# Patient Record
Sex: FEMALE | Race: WHITE | NOT HISPANIC OR LATINO | Employment: FULL TIME | URBAN - METROPOLITAN AREA
[De-identification: names, ages, dates, MRNs, and addresses within clinical notes are randomized per-mention and may not be internally consistent; named-entity substitution may affect disease eponyms.]

---

## 2017-02-10 ENCOUNTER — TELEPHONE (OUTPATIENT)
Dept: OBSTETRICS AND GYNECOLOGY | Facility: CLINIC | Age: 42
End: 2017-02-10

## 2017-02-10 NOTE — TELEPHONE ENCOUNTER
----- Message from Evens Lovett sent at 2/10/2017  1:03 PM EST -----  Regarding: NO CYCLE  Contact: [phone]  SAYS SHE HASN'T HAD CYCLE SINCE CHRISTMAS. HAS SOME QUESTIONS.      Dr. Bass pt  [phone] LMP 12/19/2016. She has taken 2 urine pregnancy test and they're both negative. Not using anything for protection. Patient denies pregnancy symptoms

## 2017-02-10 NOTE — TELEPHONE ENCOUNTER
Spoke to her and explained with a negative urine pregnancy test this most likely represents intermittent anovulation.  She's having no pain.  This is the first time this has occurred.  I told her if it starts to bother her I recommend a blood pregnancy test first and assuming negative progestin therapy for 10 days to cause a withdrawal flow.  If she doesn't start her cycle in the next week or 2 she is to give us a call back.

## 2017-08-18 ENCOUNTER — OFFICE VISIT (OUTPATIENT)
Dept: OBSTETRICS AND GYNECOLOGY | Facility: CLINIC | Age: 42
End: 2017-08-18

## 2017-08-18 VITALS
HEIGHT: 68 IN | RESPIRATION RATE: 14 BRPM | SYSTOLIC BLOOD PRESSURE: 118 MMHG | BODY MASS INDEX: 30.16 KG/M2 | DIASTOLIC BLOOD PRESSURE: 74 MMHG | WEIGHT: 199 LBS

## 2017-08-18 DIAGNOSIS — N92.1 METRORRHAGIA: Primary | ICD-10-CM

## 2017-08-18 DIAGNOSIS — N94.6 DYSMENORRHEA: ICD-10-CM

## 2017-08-18 PROCEDURE — 99214 OFFICE O/P EST MOD 30 MIN: CPT | Performed by: OBSTETRICS & GYNECOLOGY

## 2017-08-18 NOTE — PROGRESS NOTES
"Subjective   Chief Complaint   Patient presents with   • Gynecologic Exam     Francia Gonzalez is a 42 y.o. year old .  Patient's last menstrual period was 2017 (exact date).  She presents to be seen because of Trouble with her cycles for the past year.  It is been most significantly bad since .  She actually missed a couple cycles in the first part of year.  Had a normal cycle in March and then had a prolonged cycle in the subsequent months.  She thought she could've been pregnant.  Did take a pregnancy test and was not.  To date no testing has been done.  Other than the period in July being excessively painful, pain is not a significant part of her problem today.     Additionally, depression has been worse.  She has historically taken escitalopram 10 mg every other day.  If she takes it more often than every other day side effects become intolerable.    She currently does not have health insurance.    OTHER THINGS SHE WANTS TO DISCUSS TODAY:  Nothing else    The following portions of the patient's history were reviewed and updated as appropriate:current medications, allergies, past family history, past medical history, past social history and past surgical history    Smoking status: Never Smoker                                                                 Smokeless status: Not on file                       Review of Systems  Constitutional POS: nothing reported    NEG: anorexia or night sweats   Gastointestinal POS: nothing reported    NEG: bloating, change in bowel habits, melena or reflux symptoms   Genitourinary POS: nothing reported    NEG: dysuria or hematuria   Integument POS: nothing reported    NEG: moles that are changing in size, shape, color or rashes   Breast POS: did not get mammogram as recommend last visit    NEG: persistent breast lump, skin dimpling or nipple discharge         Objective   /74  Resp 14  Ht 68\" (172.7 cm)  Wt 199 lb (90.3 kg)  LMP 2017 (Exact Date) "  Breastfeeding? No  BMI 30.26 kg/m2    General:  well developed; well nourished  no acute distress   Pelvis: Clinical staff was present for exam  External genitalia:  normal appearance of the external genitalia including Bartholin's and [place]'s glands.  :  urethral meatus normal; urethral hypermobility is absent.  Vaginal:  normal pink mucosa without prolapse or lesions.  Cervix:  normal appearance.  Uterus:  normal size, shape and consistency. anteverted;  Adnexa:  normal bimanual exam of the adnexa.  Rectal:  digital rectal exam not performed; anus visually normal appearing.     Lab Review   No data reviewed    Imaging   No data reviewed        Assessment   1. Metrorrhagia - affecting her activities of daily living.  This is a new finding that does not need to be worked up further  2. Depression with anxiety suboptimally controlled on current regimen     Plan   1. Change and escitalopram to 5 mg daily with 10 mg in the luteal phase of her cycle  2. Start low-dose oral contraceptives for cycle regulation and control of cramps  3. Follow up for annual exam and for recheck of symptoms 4-6 months    New Medications Ordered This Visit   Medications   • norethindrone-ethinyl estradiol (NECON 1/35, 28,) 1-35 MG-MCG per tablet     Sig: Take 1 tablet by mouth Daily.     Dispense:  28 tablet     Refill:  5          This note was electronically signed.    Jarrod Bass M.D.  August 18, 2017

## 2017-08-18 NOTE — PROGRESS NOTES
"Subjective   Chief Complaint   Patient presents with   • Gynecologic Exam     Francia Gonzalez is a 42 y.o. year old  presenting to be seen for her annual exam.     SEXUAL Hx:  She {IS/is not:29798::\"is\"} currently sexually active.  In the past year there {Action - has/HAS NOT:01363::\"has not\"} been new sexual partners.    Condoms {Condom use:30693::\"are never used\"}.  She {would/WOULD NOT:57073::\"would not\"} like to be screened for STD's at today's exam.  Current birth control method: {Current contraceptive:80037}.  She {IS/is not:45825::\"is\"} happy with her current method of contraception and {does / DOES NOT:43304::\"does not\"} want to discuss alternative methods of contraception.  MENSTRUAL Hx:  Patient's last menstrual period was 2017 (exact date).  In the past 6 months her cycles have been {Desc - menses description:99893::\"regular, predictable and occur monthly\"}.  Her menstrual {Misc; menses description:74533::\"flow is typically normal\"}.   Each month on average there are roughly {Numbers; 0-7:70394::\"0\"} day(s) of very heavy flow.    Intermenstrual bleeding is {absent/present:63083::\"absent\"}.    Post-coital bleeding is {absent/present:50696::\"absent\"}.  Dysmenorrhea: {Affecting ADL?:89210::\"is not affecting her activities of daily living\"}  PMS: {Affecting ADL?:22716::\"is not affecting her activities of daily living\"}  Her cycles {are/ARE NOT:34572::\"are not\"} a source of concern for her that she wishes to discuss today.  HEALTH Hx:  She exercises regularly: {Responses; yes/NO/not asked:52367::\"no (and has no plans to become more active)\"}.  She wears her seat belt: {Responses; yes/not/not always (pre-select yes):30076::\"yes\"}.  She has concerns about domestic violence: {Responses; yes/NO/not asked:86195::\"no\"}.  OTHER THINGS SHE WANTS TO DISCUSS TODAY:  {Other complaints:79558::\"Nothing else\"}    The following portions of the patient's history were reviewed and updated as appropriate:{Misc - " "History reviewed:34746::\"problem list\",\"current medications\",\"allergies\",\"past family history\",\"past medical history\",\"past social history\",\"past surgical history\"}.    Smoking status: Never Smoker                                                                 Smokeless status: Not on file                       Review of Systems  Constitutional POS: {Constitutional (+) ROS:85146::\"nothing reported\"}    NEG: {Constitutional (-) ROS:30243::\"anorexia\",\"night sweats\"}   Gastointestinal POS: {GI (+) ROS:19809::\"nothing reported\"}    NEG: {GI (-) ROS:54564::\"bloating\",\"change in bowel habits\",\"melena\",\"reflux symptoms\"}   Genitourinary POS: { (+) ROS:11947::\"nothing reported\"}    NEG: { (-) ROS:56484::\"dysuria\",\"hematuria\"}   Integument POS: {Skin (+) ROS:69636::\"nothing reported\"}    NEG: {Skin (-) ROS:52722::\"moles that are changing in size, shape, color\",\"rashes\"}   Breast POS: {Breast (+) ROS:26900::\"nothing reported\"}    NEG: {Breast (-) ROS:15714::\"persistent breast lump\",\"skin dimpling\",\"nipple discharge\"}        Objective   /74  Resp 14  Ht 68\" (172.7 cm)  Wt 199 lb (90.3 kg)  LMP 08/07/2017 (Exact Date)  Breastfeeding? No  BMI 30.26 kg/m2    General:  {Exam - general findings:15268::\"well developed; well nourished\",\"no acute distress\"}   Skin:  {Exam - skin:27155::\"No suspicious lesions seen\"}   Thyroid: {Exam - thyroid:96233::\"normal to inspection and palpation\"}   Breasts:  {Exam - breasts:90517::\"Examined in supine position\",\"Symmetric without masses or skin dimpling\",\"Nipples normal without inversion, lesions or discharge\",\"There are no palpable axillary nodes\"}   Abdomen: {Exam - abdomen:00414::\"soft, non-tender; no masses\",\"no umbilical or inginual hernias are present\",\"no hepato-splenomegaly\"}   Pelvis: {Exam; GYN pelvic:04006::\"Clinical staff was present for exam\"}        Assessment   1. ***     Plan   1. ***  2. {Were Rx's given:75741}  3. Follow up {F/U reason:65540::\"for annual exam\"} " *** {follow-up time:22868}    No orders of the defined types were placed in this encounter.         This note was electronically signed.    Jarrod Bass M.D.  August 18, 2017

## 2018-01-12 RX ORDER — ESCITALOPRAM OXALATE 10 MG/1
TABLET ORAL
Qty: 30 TABLET | Refills: 0 | Status: SHIPPED | OUTPATIENT
Start: 2018-01-12 | End: 2018-03-10 | Stop reason: SDUPTHER

## 2018-01-22 ENCOUNTER — TELEPHONE (OUTPATIENT)
Dept: OBSTETRICS AND GYNECOLOGY | Facility: CLINIC | Age: 43
End: 2018-01-22

## 2018-01-22 NOTE — TELEPHONE ENCOUNTER
Kali pt requesting refills on birth control until her appt on 5/16/18 be sent to [facility] [place]

## 2018-03-14 RX ORDER — ESCITALOPRAM OXALATE 10 MG/1
TABLET ORAL
Qty: 30 TABLET | Refills: 0 | Status: SHIPPED | OUTPATIENT
Start: 2018-03-14 | End: 2018-05-11 | Stop reason: SDUPTHER

## 2018-05-15 RX ORDER — ESCITALOPRAM OXALATE 10 MG/1
TABLET ORAL
Qty: 30 TABLET | Refills: 0 | Status: SHIPPED | OUTPATIENT
Start: 2018-05-15 | End: 2018-05-16 | Stop reason: SDUPTHER

## 2018-05-16 ENCOUNTER — OFFICE VISIT (OUTPATIENT)
Dept: OBSTETRICS AND GYNECOLOGY | Facility: CLINIC | Age: 43
End: 2018-05-16

## 2018-05-16 VITALS
SYSTOLIC BLOOD PRESSURE: 112 MMHG | DIASTOLIC BLOOD PRESSURE: 74 MMHG | BODY MASS INDEX: 29.95 KG/M2 | WEIGHT: 197 LBS | RESPIRATION RATE: 14 BRPM

## 2018-05-16 DIAGNOSIS — Z01.419 WELL WOMAN EXAM WITH ROUTINE GYNECOLOGICAL EXAM: Primary | ICD-10-CM

## 2018-05-16 DIAGNOSIS — F41.8 DEPRESSION WITH ANXIETY: ICD-10-CM

## 2018-05-16 PROCEDURE — 99396 PREV VISIT EST AGE 40-64: CPT | Performed by: OBSTETRICS & GYNECOLOGY

## 2018-05-16 RX ORDER — ESCITALOPRAM OXALATE 10 MG/1
10 TABLET ORAL EVERY MORNING
Qty: 30 TABLET | Refills: 12 | Status: SHIPPED | OUTPATIENT
Start: 2018-05-16 | End: 2019-05-22 | Stop reason: SDUPTHER

## 2018-05-16 NOTE — PROGRESS NOTES
Subjective   Chief Complaint   Patient presents with   • Gynecologic Exam     Francia Gonzalez is a 42 y.o. year old  presenting to be seen for her annual exam. Happy with her current dose of Lexapro.  Life is busy which makes things a little more stressful but overall she is doing well on medicine.    SEXUAL Hx:  She is currently sexually active.  In the past year there there has been NO new sexual partners.    Condoms are never used.  She would not like to be screened for STD's at today's exam.  Current birth control method: OCP (estrogen/progesterone).  She is happy with her current method of contraception and does not want to discuss alternative methods of contraception.  MENSTRUAL Hx:  Patient's last menstrual period was 2018 (exact date).   Menses better with the BCP's  In the past 6 months her cycles have been regular, predictable and occur monthly.  Her menstrual flow is typically normal.   Each month on average there are roughly 0 day(s) of very heavy flow.    Intermenstrual bleeding is absent.    Post-coital bleeding is absent.  Dysmenorrhea: is not affecting her activities of daily living  PMS: is not affecting her activities of daily living  Her cycles are not a source of concern for her that she wishes to discuss today.  HEALTH Hx:  She exercises regularly: yes.  She wears her seat belt: yes.  She has concerns about domestic violence: no.  OTHER THINGS SHE WANTS TO DISCUSS TODAY:  Libido worse with the BCP's.  Not yet affecting her activities of daily living.    The following portions of the patient's history were reviewed and updated as appropriate:problem list, current medications, allergies, past family history, past medical history, past social history and past surgical history.    Smoking status: Never Smoker                                                                 Smokeless tobacco: Not on file                       Review of Systems  Constitutional POS: nothing reported    NEG:  anorexia or night sweats   Genitourinary POS: nothing reported    NEG: dysuria or hematuria      Gastointestinal POS: nothing reported    NEG: bloating, change in bowel habits, melena or reflux symptoms   Integument POS: nothing reported    NEG: moles that are changing in size, shape, color or rashes   Breast POS: nothing reported    NEG: persistent breast lump, skin dimpling or nipple discharge        Objective   /74   Resp 14   Wt 89.4 kg (197 lb)   LMP 04/19/2018 (Exact Date)   Breastfeeding? No   BMI 29.95 kg/m²     General:  well developed; well nourished  no acute distress   Skin:  No suspicious lesions seen   Thyroid: normal to inspection and palpation   Breasts:  Examined in supine position  Symmetric without masses or skin dimpling  Nipples normal without inversion, lesions or discharge  There are no palpable axillary nodes   Abdomen: soft, non-tender; no masses  no umbilical or inginual hernias are present  no hepato-splenomegaly   Pelvis: Clinical staff was present for exam  External genitalia:  normal appearance of the external genitalia including Bartholin's and [place]'s glands.  :  urethral meatus normal;  Vaginal:  normal pink mucosa without prolapse or lesions.  Cervix:  normal appearance.  Uterus:  normal size, shape and consistency. anteverted;  Adnexa:  normal bimanual exam of the adnexa.  Rectal:  digital rectal exam not performed; anus visually normal appearing.        Assessment   1. Normal GYN exam   2. DUB - better with BCP's  3. Low libido not yet impacting quality of life.  May be related to life stresses and hectic this more so than birth control pills  4. Depression with anxiety stable with medication  5. She is up to date on all relevant gynecologic and colorectal screenings except mammography     Plan   1. Pap was done today.  If she does not receive the results of the Pap within 2 weeks  time, she was instructed to call to find out the results.  I explained to Francia that  the recommendations for Pap smear interval in a low risk patient's has lengthened to 3 years time.  I encouraged her to be seen yearly for a full physical exam including breast and pelvic exam even during the off years when PAP's will not be performed.  2. She was encouraged to get yearly mammograms.  She should report any palpable breast lump(s) or skin changes regardless of mammographic findings.  I explained to Francia that notification regarding her mammogram results will come from the center performing the study.  Our office will not be routinely calling with mammogram results.  It is her responsibility to make sure that the results from the mammogram are communicated to her by the breast center.  If she has any questions about the results, she is welcome to call our office anytime.  3. Prescription(s) for OCP's and anti-depressants were refilled today   4. The importance of keeping all planned follow-up and taking all medications as prescribed was emphasized.  5. Follow up for annual exam 1 year    New Medications Ordered This Visit   Medications   • norethindrone-ethinyl estradiol (NECON 1/35, 28,) 1-35 MG-MCG per tablet     Sig: Take 1 tablet by mouth Daily.     Dispense:  28 tablet     Refill:  13   • escitalopram (LEXAPRO) 10 MG tablet     Sig: Take 1 tablet by mouth Every Morning.     Dispense:  30 tablet     Refill:  12          This note was electronically signed.    Jarrod Bass M.D.  May 16, 2018    Note: Speech recognition transcription software may have been used to create portions of this document.  An attempt at proofreading has been made but errors in transcription could still be present.

## 2019-05-19 NOTE — PROGRESS NOTES
Subjective   Chief Complaint   Patient presents with   • Gynecologic Exam     Francia Gonzalez is a 43 y.o. year old  presenting to be seen for her annual exam.  Libido is still low but slightly better.    Her depression with anxiety is manageable with medication.  Most days she is only taking half of the 10 mg pill.    Her son is overall doing well.  She gets worried about him having anxiety and depression at times.  Made friends with some of the upper classmate at school.  Sad that school is ended at its summertime.    SEXUAL Hx:  She is currently sexually active.  In the past year there there has been NO new sexual partners.    Condoms are never used.  She would not like to be screened for STD's at today's exam.  Current birth control method: OCP (estrogen/progesterone).  She is happy with her current method of contraception and does not want to discuss alternative methods of contraception.  MENSTRUAL Hx:  Patient's last menstrual period was 2019 (approximate).  In the past 6 months her cycles have been regular, predictable and occur monthly.  Her menstrual flow is typically normal.   Each month on average there are roughly 0 day(s) of very heavy flow.    Intermenstrual bleeding is absent.    Post-coital bleeding is absent.  Dysmenorrhea: is not affecting her activities of daily living  PMS: is not affecting her activities of daily living  Her cycles are not a source of concern for her that she wishes to discuss today.  HEALTH Hx:  She exercises regularly: yes.  She wears her seat belt: yes.  She has concerns about domestic violence: no.  OTHER THINGS SHE WANTS TO DISCUSS TODAY:  Nothing else    The following portions of the patient's history were reviewed and updated as appropriate:problem list, current medications, allergies, past family history, past medical history, past social history and past surgical history.    Social History    Tobacco Use      Smoking status: Never Smoker    Review of  Systems  Constitutional POS: nothing reported    NEG: anorexia or night sweats   Genitourinary POS: nocturia - still not completed the UROLOG    NEG: dysuria or hematuria      Gastointestinal POS: nothing reported    NEG: bloating, change in bowel habits, melena or reflux symptoms   Integument POS: nothing reported    NEG: moles that are changing in size, shape, color or rashes   Breast POS: nothing reported and had normal mammogram in the past year - results are not in record for review    NEG: persistent breast lump, skin dimpling or nipple discharge        Objective   /76   Resp 14   Wt 88 kg (194 lb)   LMP 05/16/2019 (Approximate)   Breastfeeding? No   BMI 29.50 kg/m²     General:  well developed; well nourished  no acute distress   Skin:  No suspicious lesions seen   Thyroid: normal to inspection and palpation   Breasts:  Examined in supine position  Symmetric without masses or skin dimpling  Nipples normal without inversion, lesions or discharge  There are no palpable axillary nodes   Abdomen: soft, non-tender; no masses  no umbilical or inguinal hernias are present  no hepato-splenomegaly   Pelvis: Clinical staff was present for exam  External genitalia:  normal appearance of the external genitalia including Bartholin's and [place]'s glands.  :  urethral meatus normal;  Vaginal:  normal pink mucosa without prolapse or lesions.  Cervix:  normal appearance.  Uterus:  normal size, shape and consistency.  Adnexa:  normal bimanual exam of the adnexa.  Rectal:  digital rectal exam not performed; anus visually normal appearing.        Assessment    1. Normal GYN exam  2. H/o depression with anxiety - stable with meds  3. H/o DUB - better with BCP's  4. Nocturia impacting activities of daily living     Plan   1. Pap was not done today.  I explained to Francia that the recommendations for Pap smear interval in a low risk patient has lengthened to 3 years time.  I told Francia she still needs to be seen in  our office yearly for a full physical including breast and pelvic exam.  2. She was encouraged to get yearly mammograms.  She should report any palpable breast lump(s) or skin changes regardless of mammographic findings.  I explained to Francia that notification regarding her mammogram results will come from the center performing the study.  Our office will not be routinely calling with mammogram results.  It is her responsibility to make sure that the results from the mammogram are communicated to her by the breast center.  If she has any questions about the results, she is welcome to call our office anytime.  3. The following data needs to be obtained to update her medical records: last mammogram.  4. The importance of keeping all planned follow-up and taking all medications as prescribed was emphasized.  5. Follow up with UROLOG or PRN for annual exam in 1 year    New Medications Ordered This Visit   Medications   • escitalopram (LEXAPRO) 10 MG tablet     Sig: Take 1 tablet by mouth Every Morning.     Dispense:  30 tablet     Refill:  12   • norethindrone-ethinyl estradiol (NECON 1/35, 28,) 1-35 MG-MCG per tablet     Sig: Take 1 tablet by mouth Daily.     Dispense:  28 tablet     Refill:  13          This note was electronically signed.    Jarrod Bass M.D.  May 22, 2019    Note: Speech recognition transcription software may have been used to create portions of this document.  An attempt at proofreading has been made but errors in transcription could still be present.

## 2019-05-22 ENCOUNTER — OFFICE VISIT (OUTPATIENT)
Dept: OBSTETRICS AND GYNECOLOGY | Facility: CLINIC | Age: 44
End: 2019-05-22

## 2019-05-22 VITALS
RESPIRATION RATE: 14 BRPM | WEIGHT: 194 LBS | SYSTOLIC BLOOD PRESSURE: 118 MMHG | BODY MASS INDEX: 29.5 KG/M2 | DIASTOLIC BLOOD PRESSURE: 76 MMHG

## 2019-05-22 DIAGNOSIS — Z01.419 WELL WOMAN EXAM WITH ROUTINE GYNECOLOGICAL EXAM: Primary | ICD-10-CM

## 2019-05-22 PROCEDURE — 99396 PREV VISIT EST AGE 40-64: CPT | Performed by: OBSTETRICS & GYNECOLOGY

## 2019-05-22 RX ORDER — NORETHINDRONE AND ETHINYL ESTRADIOL 1 MG-35MCG
KIT ORAL
Qty: 28 TABLET | Refills: 13 | OUTPATIENT
Start: 2019-05-22

## 2019-05-22 RX ORDER — ESCITALOPRAM OXALATE 10 MG/1
10 TABLET ORAL EVERY MORNING
Qty: 30 TABLET | Refills: 12 | Status: SHIPPED | OUTPATIENT
Start: 2019-05-22 | End: 2020-06-04 | Stop reason: SDUPTHER

## 2020-05-22 ENCOUNTER — TELEPHONE (OUTPATIENT)
Dept: OBSTETRICS AND GYNECOLOGY | Facility: CLINIC | Age: 45
End: 2020-05-22

## 2020-06-04 ENCOUNTER — OFFICE VISIT (OUTPATIENT)
Dept: OBSTETRICS AND GYNECOLOGY | Facility: CLINIC | Age: 45
End: 2020-06-04

## 2020-06-04 VITALS
DIASTOLIC BLOOD PRESSURE: 78 MMHG | HEART RATE: 56 BPM | HEIGHT: 68 IN | BODY MASS INDEX: 31.37 KG/M2 | OXYGEN SATURATION: 98 % | RESPIRATION RATE: 20 BRPM | TEMPERATURE: 98.3 F | WEIGHT: 207 LBS | SYSTOLIC BLOOD PRESSURE: 126 MMHG

## 2020-06-04 DIAGNOSIS — F41.8 DEPRESSION WITH ANXIETY: ICD-10-CM

## 2020-06-04 DIAGNOSIS — Z01.419 WELL WOMAN EXAM WITH ROUTINE GYNECOLOGICAL EXAM: Primary | ICD-10-CM

## 2020-06-04 PROCEDURE — 99396 PREV VISIT EST AGE 40-64: CPT | Performed by: OBSTETRICS & GYNECOLOGY

## 2020-06-04 RX ORDER — ESCITALOPRAM OXALATE 10 MG/1
5 TABLET ORAL EVERY MORNING
Qty: 30 TABLET | Refills: 9 | Status: SHIPPED | OUTPATIENT
Start: 2020-06-04 | End: 2021-07-14

## 2020-06-04 NOTE — PROGRESS NOTES
Subjective   Chief Complaint   Patient presents with   • Gynecologic Exam     no c/o      Francia Gonzalez is a 44 y.o. year old  presenting to be seen for her annual exam. Her depression/anxiety has been well controlled.  She takes a half of a 10 mg Lexapro tablet and does well.    Last year she complained about nocturia and UROLOG was recommended.  She is really noticed no change but does not wish to pursue it any further at this point    SEXUAL Hx:  She is currently sexually active.  In the past year there there has been NO new sexual partners.    Condoms are never used.  She would not like to be screened for STD's at today's exam.  Current birth control method: OCP (estrogen/progesterone).  She is happy with her current method of contraception and does not want to discuss alternative methods of contraception.  MENSTRUAL Hx:  In the past 6 months her cycles have been regular, predictable and occur monthly.  Her menstrual flow is typically normal.   Each month on average there are roughly 0 day(s) of very heavy flow.  Intermenstrual bleeding is absent.    Post-coital bleeding is absent.  Dysmenorrhea: is not affecting her activities of daily living  PMS: is not affecting her activities of daily living  Her cycles are not a source of concern for her that she wishes to discuss today.  HEALTH Hx:  She exercises regularly: yes.  She wears her seat belt: yes.  She has concerns about domestic violence: no.  OTHER THINGS SHE WANTS TO DISCUSS TODAY:  Nothing else    The following portions of the patient's history were reviewed and updated as appropriate:problem list, current medications, allergies, past family history, past medical history, past social history and past surgical history.    Social History    Tobacco Use      Smoking status: Never Smoker    Review of Systems  Constitutional POS: nothing reported    NEG: anorexia or night sweats   Genitourinary POS: see HPI    NEG: dysuria or hematuria     "  Gastointestinal POS: nothing reported    NEG: bloating, change in bowel habits, melena or reflux symptoms   Integument POS: nothing reported    NEG: moles that are changing in size, shape, color or rashes   Breast POS: nothing reported    NEG: persistent breast lump, skin dimpling or nipple discharge        Objective   /78   Pulse 56   Temp 98.3 °F (36.8 °C) (Temporal)   Resp 20   Ht 172.7 cm (68\")   Wt 93.9 kg (207 lb)   SpO2 98%   BMI 31.47 kg/m²     General:  well developed; well nourished  no acute distress   Skin:  No suspicious lesions seen   Thyroid: normal to inspection and palpation   Breasts:  Examined in supine position  Symmetric without masses or skin dimpling  Nipples normal without inversion, lesions or discharge  There are no palpable axillary nodes   Abdomen: soft, non-tender; no masses  no umbilical or inguinal hernias are present  no hepato-splenomegaly   Pelvis: Clinical staff was present for exam  External genitalia:  normal appearance of the external genitalia including Bartholin's and [place]'s glands.  :  urethral meatus normal;  Vaginal:  normal pink mucosa without prolapse or lesions.  Cervix:  normal appearance.  Uterus:  normal size, shape and consistency.  Adnexa:  normal bimanual exam of the adnexa.  Rectal:  digital rectal exam not performed; anus visually normal appearing.        Assessment   1. Normal GYN exam  2. Depression/anxiety - stable with medication  3. H/o DUB - better with birth control pills  4. Urinary frequency and urgency not impacting day-to-day function     Plan   1. Pap was not done today.  I explained to Francia that the recommendations for Pap smear interval in a low risk patient has lengthened to 3 years time.  I told Francia she still needs to be seen in our office yearly for a full physical including breast and pelvic exam.  2. She was encouraged to get yearly mammograms.  She should report any palpable breast lump(s) or skin changes regardless of " mammographic findings.  I explained to Francia that notification regarding her mammogram results will come from the center performing the study.  Our office will not be routinely calling with mammogram results.  It is her responsibility to make sure that the results from the mammogram are communicated to her by the breast center.  If she has any questions about the results, she is welcome to call our office anytime.  3. The following data needs to be obtained to update her medical records: last mammogram.  4. The importance of keeping all planned follow-up and taking all medications as prescribed was emphasized.  5. Follow up for annual exam 1 year    New Medications Ordered This Visit   Medications   • norethindrone-ethinyl estradiol (Necon 1/35, 28,) 1-35 MG-MCG per tablet     Sig: Take 1 tablet by mouth Daily.     Dispense:  28 tablet     Refill:  12   • escitalopram (LEXAPRO) 10 MG tablet     Sig: Take 0.5 tablets by mouth Every Morning.     Dispense:  30 tablet     Refill:  9          This note was electronically signed.    Jarrod Bass M.D.  June 4, 2020    Note: Speech recognition transcription software may have been used to create portions of this document.  An attempt at proofreading has been made but errors in transcription could still be present.

## 2021-06-09 ENCOUNTER — TELEPHONE (OUTPATIENT)
Dept: OBSTETRICS AND GYNECOLOGY | Facility: CLINIC | Age: 46
End: 2021-06-09

## 2021-06-09 NOTE — TELEPHONE ENCOUNTER
Pt called in to state that she will need a refill on her birth control before her next scheduled appt. Would like a new RX sent in please

## 2021-06-09 NOTE — TELEPHONE ENCOUNTER
Done.  Will she need her Lexapro as well?    New Medications Ordered This Visit   Medications   • norethindrone-ethinyl estradiol (Necon 1/35, 28,) 1-35 MG-MCG per tablet     Sig: Take 1 tablet by mouth Daily.     Dispense:  28 tablet     Refill:  2

## 2021-06-16 ENCOUNTER — TELEPHONE (OUTPATIENT)
Dept: OBSTETRICS AND GYNECOLOGY | Facility: CLINIC | Age: 46
End: 2021-06-16

## 2021-06-17 RX ORDER — NORETHINDRONE AND ETHINYL ESTRADIOL 1 MG-35MCG
1 KIT ORAL DAILY
Qty: 28 TABLET | Refills: 3 | Status: SHIPPED | OUTPATIENT
Start: 2021-06-17 | End: 2021-09-07 | Stop reason: SDUPTHER

## 2021-06-17 NOTE — TELEPHONE ENCOUNTER
Refilled for 4 months    New Medications Ordered This Visit   Medications   • norethindrone-ethinyl estradiol (Pirmella 1/35) 1-35 MG-MCG per tablet     Sig: Take 1 tablet by mouth Daily.     Dispense:  28 tablet     Refill:  3

## 2021-06-17 NOTE — TELEPHONE ENCOUNTER
Spoke to the pharmacy and they stated that the necon has been discontinued. Verbal was given for primella 1-35.

## 2021-07-14 RX ORDER — ESCITALOPRAM OXALATE 10 MG/1
TABLET ORAL
Qty: 30 TABLET | Refills: 0 | Status: SHIPPED | OUTPATIENT
Start: 2021-07-14 | End: 2021-09-16 | Stop reason: SDUPTHER

## 2021-09-07 ENCOUNTER — TELEPHONE (OUTPATIENT)
Dept: OBSTETRICS AND GYNECOLOGY | Facility: CLINIC | Age: 46
End: 2021-09-07

## 2021-09-07 RX ORDER — NORETHINDRONE AND ETHINYL ESTRADIOL 1 MG-35MCG
1 KIT ORAL DAILY
Qty: 28 TABLET | Refills: 3 | Status: SHIPPED | OUTPATIENT
Start: 2021-09-07 | End: 2021-11-29 | Stop reason: SDUPTHER

## 2021-09-07 NOTE — TELEPHONE ENCOUNTER
Done    New Medications Ordered This Visit   Medications   • norethindrone-ethinyl estradiol (Pirmella 1/35) 1-35 MG-MCG per tablet     Sig: Take 1 tablet by mouth Daily.     Dispense:  28 tablet     Refill:  3

## 2021-09-07 NOTE — TELEPHONE ENCOUNTER
Rx Refill Note  Requested Prescriptions      No prescriptions requested or ordered in this encounter      Last office visit with prescribing clinician: 6/4/2020      Next office visit with prescribing clinician: 11/30/2021     Desires refill of OCP.       Evelyn Thomas RN  09/07/21, 14:46 EDT

## 2021-09-07 NOTE — TELEPHONE ENCOUNTER
zoey      Pt needs refill on bc pirmella.      Pharm:  [facility] [address]. In [place], [place]

## 2021-09-16 RX ORDER — ESCITALOPRAM OXALATE 10 MG/1
5 TABLET ORAL EVERY MORNING
Qty: 30 TABLET | Refills: 3 | Status: SHIPPED | OUTPATIENT
Start: 2021-09-16 | End: 2021-11-29 | Stop reason: SDUPTHER

## 2021-09-16 NOTE — TELEPHONE ENCOUNTER
Done      New Medications Ordered This Visit   Medications   • escitalopram (LEXAPRO) 10 MG tablet     Sig: Take 0.5 tablets by mouth Every Morning.     Dispense:  30 tablet     Refill:  3

## 2021-09-16 NOTE — TELEPHONE ENCOUNTER
Rx Refill Note  Requested Prescriptions     Pending Prescriptions Disp Refills   • escitalopram (LEXAPRO) 10 MG tablet 30 tablet 0     Sig: Take 0.5 tablets by mouth Every Morning.      Last office visit with prescribing clinician: 6/4/2020      Next office visit with prescribing clinician: 11/30/2021            Allison Otto MA  09/16/21, 12:32 EDT

## 2021-09-16 NOTE — TELEPHONE ENCOUNTER
NUVIA      PT NEEDS REFILL ON ESCITALOPRAM 10MG      PHARM:  [facility] [address]  IN [place], [place]

## 2021-11-30 ENCOUNTER — OFFICE VISIT (OUTPATIENT)
Dept: OBSTETRICS AND GYNECOLOGY | Facility: CLINIC | Age: 46
End: 2021-11-30

## 2021-11-30 VITALS
SYSTOLIC BLOOD PRESSURE: 118 MMHG | DIASTOLIC BLOOD PRESSURE: 76 MMHG | WEIGHT: 209 LBS | BODY MASS INDEX: 31.78 KG/M2 | RESPIRATION RATE: 14 BRPM

## 2021-11-30 DIAGNOSIS — Z12.11 SCREEN FOR COLON CANCER: ICD-10-CM

## 2021-11-30 DIAGNOSIS — Z01.419 WELL WOMAN EXAM WITH ROUTINE GYNECOLOGICAL EXAM: Primary | ICD-10-CM

## 2021-11-30 DIAGNOSIS — F41.8 DEPRESSION WITH ANXIETY: ICD-10-CM

## 2021-11-30 DIAGNOSIS — Z71.85 VACCINE COUNSELING: ICD-10-CM

## 2021-11-30 PROCEDURE — 99396 PREV VISIT EST AGE 40-64: CPT | Performed by: OBSTETRICS & GYNECOLOGY

## 2021-11-30 RX ORDER — CLONAZEPAM 1 MG/1
1 TABLET ORAL NIGHTLY PRN
Qty: 30 TABLET | Refills: 1 | Status: SHIPPED | OUTPATIENT
Start: 2021-11-30 | End: 2023-03-19 | Stop reason: SDUPTHER

## 2021-11-30 RX ORDER — ESCITALOPRAM OXALATE 10 MG/1
5 TABLET ORAL EVERY MORNING
Qty: 90 TABLET | Refills: 4 | Status: SHIPPED | OUTPATIENT
Start: 2021-11-30 | End: 2022-05-24

## 2021-11-30 RX ORDER — NORETHINDRONE AND ETHINYL ESTRADIOL 1 MG-35MCG
1 KIT ORAL DAILY
Qty: 84 TABLET | Refills: 4 | Status: SHIPPED | OUTPATIENT
Start: 2021-11-30 | End: 2023-01-30 | Stop reason: SDUPTHER

## 2021-11-30 RX ORDER — SULFAMETHOXAZOLE AND TRIMETHOPRIM 400; 80 MG/1; MG/1
1 TABLET ORAL 2 TIMES DAILY
Qty: 20 TABLET | Refills: 0 | Status: SHIPPED | OUTPATIENT
Start: 2021-11-30 | End: 2021-12-10

## 2021-11-30 NOTE — PROGRESS NOTES
Subjective   Chief Complaint   Patient presents with   • Gynecologic Exam     Francia Gonzalez is a 46 y.o. year old  presenting to be seen for her annual exam and follow-up on her depression.  She has a couple of issues she wants to talk about today.  She has been taking Lexapro.  Her anxiety is less well controlled.  Her mother thinks it could be that she is in perimenopause.  She does not think that is the case.  She thinks a lot of her anxiety could be related to the impact of the pandemic on her OCD.  She is have a hard time controlling things in her life that she typically can manage.  When she is able to sleep she sleeps well but she has a hard time falling asleep.  Typically she does not take anything for sleep.    She also has noticed she had chronic itching    She also has an ingrown hair she believes in her pubic region that is painful.    SEXUAL Hx:  She is not currently sexually active.  In the past year there there has been NO new sexual partners.    Condoms are not needed because she is not sexually active.  She would not like to be screened for STD's at today's exam.  Current birth control method: abstinence and OCP (estrogen/progesterone).  MENSTRUAL Hx:  Patient's last menstrual period was 2021 (approximate).  In the past 6 months her cycles have been regular, predictable and occur monthly.  Her menstrual flow is typically normal.   Each month on average there are roughly 0 day(s) of very heavy flow.  Intermenstrual bleeding is absent.    Post-coital bleeding is n/a.  Dysmenorrhea: is not affecting her activities of daily living  PMS: is not affecting her activities of daily living  Her cycles are not a source of concern for her that she wishes to discuss today.  HEALTH Hx:  She exercises regularly: yes.  She wears her seat belt: yes.  She has concerns about domestic violence: no.  OTHER THINGS SHE WANTS TO DISCUSS TODAY:  Nothing else    The following portions of the patient's history  were reviewed and updated as appropriate:problem list, current medications, allergies, past family history, past medical history, past social history and past surgical history.    Social History    Tobacco Use      Smoking status: Never Smoker      Smokeless tobacco: Not on file    Review of Systems  Constitutional POS: nothing reported    NEG: anorexia or night sweats   Genitourinary POS: nothing reported    NEG: dysuria or hematuria      Gastointestinal POS: nothing reported    NEG: bloating, change in bowel habits, melena or reflux symptoms   Integument POS: nothing reported    NEG: moles that are changing in size, shape, color or rashes   Breast POS: nothing reported and had normal mammogram in the past 2 years - results are not in record for review    NEG: persistent breast lump, skin dimpling or nipple discharge        Objective   /76   Resp 14   Wt 94.8 kg (209 lb)   LMP 11/04/2021 (Approximate)   Breastfeeding No   BMI 31.78 kg/m²     General:  well developed; well nourished  no acute distress   Skin:  No suspicious lesions seen   Thyroid: normal to inspection and palpation   Breasts:  Examined in supine position  Symmetric without masses or skin dimpling  Nipples normal without inversion, lesions or discharge  There are no palpable axillary nodes   Abdomen: soft, non-tender; no masses  no umbilical or inguinal hernias are present  no hepato-splenomegaly   Pelvis: Clinical staff was present for exam  External genitalia:  normal appearance of the external genitalia including Bartholin's and [place]'s glands.  There is a less than 5 mm area on the mons that is somewhat red and irritated.  There is no crepitus.  :  urethral meatus normal;  Vaginal:  normal pink mucosa without prolapse or lesions.  Cervix:  normal appearance.  Uterus:  normal size, shape and consistency.  Adnexa:  normal bimanual exam of the adnexa.  Rectal:  digital rectal exam not performed; anus visually normal appearing.         Assessment   1. Normal GYN exam  2. Anxiety with depression - suboptimally controlled with Lexapro.  May be impacted by issues with sleeplessness  3. Family history of colon cancer - patient is past due for screening colonoscopy  4. Folliculitis of vulva  5. She is up to date on all relevant gynecologic and colorectal screenings     Plan   1. Pap was done today.  If she does not receive the results of the Pap within 2 weeks  time, she was instructed to call to find out the results.  I explained to Francia that the recommendations for Pap smear interval in a low risk patient's has lengthened to 3 years time.  I encouraged her to be seen yearly for a full physical exam including breast and pelvic exam even during the off years when PAP's will not be performed.  2. She was encouraged to get yearly mammograms.  She should report any palpable breast lump(s) or skin changes regardless of mammographic findings.  I explained to Francia that notification regarding her mammogram results will come from the center performing the study.  Our office will not be routinely calling with mammogram results.  It is her responsibility to make sure that the results from the mammogram are communicated to her by the breast center.  If she has any questions about the results, she is welcome to call our office anytime.  3. Colonoscopy was recommended for screening for colon cancer.  The procedure was briefly discussed and its benefits for early detection of colon cancer were emphasized.  I explained to Francia that we could help her to schedule it if she wishes.  Additionally, she could also contact her primary care physician to help make this arrangement.  Because she is not at average risk for colon cancer, Cologuard screening would not be an option I would recommend.  After considering these options she will contact her PCP to arrange colonoscopy.  4. The following data needs to be obtained to update her medical records: last  mammogram.  5. I discussed with Francia that she may be behind on needed vaccinations for COVID booster.  She may be able to obtain these vaccinations at her local pharmacy OR speak about obtaining them with her primary care.  If she does obtain her vaccines, I have asked Francia to let us know the date each vaccine was obtained so that her medical record could be updated in our system.  6. The importance of keeping all planned follow-up and taking all medications as prescribed was emphasized.  7. Follow up in 4 to 6 weeks time to see the impact of the addition of Klonopin.  If that does not help would maybe consider changing to either Paxil or Zoloft to see if that makes a difference     New Medications Ordered This Visit   Medications   • norethindrone-ethinyl estradiol (Pirmella 1/35) 1-35 MG-MCG per tablet     Sig: Take 1 tablet by mouth Daily.     Dispense:  84 tablet     Refill:  4   • escitalopram (LEXAPRO) 10 MG tablet     Sig: Take 0.5 tablets by mouth Every Morning.     Dispense:  90 tablet     Refill:  4   • clonazePAM (KlonoPIN) 1 MG tablet     Sig: Take 1 tablet by mouth At Night As Needed for Anxiety.     Dispense:  30 tablet     Refill:  1   • sulfamethoxazole-trimethoprim (BACTRIM,SEPTRA) 400-80 MG tablet     Sig: Take 1 tablet by mouth 2 (Two) Times a Day for 10 days.     Dispense:  20 tablet     Refill:  0          This note was electronically signed.    Jarrod Bass M.D.  November 30, 2021    Part of this note may be an electronic transcription/translation of spoken language to printed text using the Dragon Dictation System.

## 2022-01-10 ENCOUNTER — TELEPHONE (OUTPATIENT)
Dept: OBSTETRICS AND GYNECOLOGY | Facility: CLINIC | Age: 47
End: 2022-01-10

## 2022-01-10 NOTE — TELEPHONE ENCOUNTER
Spoke with Francia, she report itching with anxiety has almost entirely decreased and she is reassured with the Klonopin RX that she can function without issues.  Pt reports she is only taking Klonopin 3-4 times in the last several weeks.  I discussed these things with Dr Bass and he indicated there was no need for in person visit tomorrow.  Francia was very thrilled and will call office if s/s of depression and anxiety return or worsen.

## 2022-01-10 NOTE — TELEPHONE ENCOUNTER
Can you call her and see how she is doing with her depression/anxiety?  Last time we added Klonopin to her medicine.  I am curious how that combination is working.  Thank you

## 2022-01-10 NOTE — TELEPHONE ENCOUNTER
zoey      Pt has appt tomorrow for meds check, but pt wants to know if she could do a telephone or virtual visit tomorrow? Says it's too far to drive just for a few minutes of seeing doctor.

## 2022-05-24 RX ORDER — ESCITALOPRAM OXALATE 10 MG/1
TABLET ORAL
Qty: 30 TABLET | Refills: 0 | Status: SHIPPED | OUTPATIENT
Start: 2022-05-24 | End: 2022-07-11

## 2022-07-11 RX ORDER — ESCITALOPRAM OXALATE 10 MG/1
TABLET ORAL
Qty: 30 TABLET | Refills: 0 | Status: SHIPPED | OUTPATIENT
Start: 2022-07-11 | End: 2022-09-06

## 2022-09-06 RX ORDER — ESCITALOPRAM OXALATE 10 MG/1
TABLET ORAL
Qty: 30 TABLET | Refills: 0 | Status: SHIPPED | OUTPATIENT
Start: 2022-09-06 | End: 2023-02-03 | Stop reason: SDUPTHER

## 2023-01-30 RX ORDER — NORETHINDRONE AND ETHINYL ESTRADIOL 1 MG-35MCG
1 KIT ORAL DAILY
Qty: 84 TABLET | Refills: 0 | Status: SHIPPED | OUTPATIENT
Start: 2023-01-30 | End: 2023-03-19 | Stop reason: SDUPTHER

## 2023-01-30 RX ORDER — NORETHINDRONE AND ETHINYL ESTRADIOL 1 MG-35MCG
KIT ORAL
Qty: 84 TABLET | Refills: 0 | OUTPATIENT
Start: 2023-01-30

## 2023-01-30 NOTE — TELEPHONE ENCOUNTER
Dr. Bass okayed a 3 month supply, called and informed patient accordingly, she verified understanding.

## 2023-01-30 NOTE — TELEPHONE ENCOUNTER
Called and spoke with patient, got her scheduled for  Annual.  Routing to physician to review if short term refill would be appropriate. Confirmed pharmacy on file.

## 2023-02-03 RX ORDER — ESCITALOPRAM OXALATE 10 MG/1
5 TABLET ORAL EVERY MORNING
Qty: 30 TABLET | Refills: 0 | Status: SHIPPED | OUTPATIENT
Start: 2023-02-03 | End: 2023-03-19 | Stop reason: SDUPTHER

## 2023-02-03 NOTE — TELEPHONE ENCOUNTER
MAR GALDAMEZ    [phone]    NEEDS REFILL OF [facility]     PHARMACY [facility]

## 2023-03-19 NOTE — PROGRESS NOTES
Subjective   Chief Complaint   Patient presents with   • Gynecologic Exam     Francia Gonzalez is a 47 y.o. year old  presenting to be seen for her annual exam and f/u on her anxiety.  She started taking clonazepam infrequently.  She takes it a couple times a week.  She does sleep better while taking it and it did help her anxiety.  She is having more trouble with depression now especially during the placebo week of her birth control pills.  She has trouble sleeping during her placebo week of birth control pills even while taking clonazepam.    Diagnosed with a Baker's cyst involving the right knee.  Plans to see orthopedist in follow-up.    Previously colon cancer screening was recommended.  She still needs to get it done.    SEXUAL Hx:  She is currently sexually active.  In the past year there there has been NO new sexual partners.    Condoms are never used.  She would not like to be screened for STD's at today's exam.  Current birth control method: OCP (estrogen/progesterone).  She is happy with her current method of contraception and does not want to discuss alternative methods of contraception.  MENSTRUAL Hx:  Patient's last menstrual period was 2023.  In the past 6 months her cycles have been regular, predictable and occur monthly.  Her menstrual flow is typically normal.   Each month on average there are roughly 0 day(s) of very heavy flow.  Intermenstrual bleeding is absent.    Post-coital bleeding is absent.  Dysmenorrhea: is not affecting her activities of daily living  PMS: is not affecting her activities of daily living  Her cycles are not a source of concern for her that she wishes to discuss today.  HEALTH Hx:  She exercises regularly: no (but is planning to start exercising more).  She wears her seat belt: yes.  She has concerns about domestic violence: no.  OTHER THINGS SHE WANTS TO DISCUSS TODAY:  Nothing else    The following portions of the patient's history were reviewed and updated as  appropriate:problem list, current medications, allergies, past family history, past medical history, past social history and past surgical history.    Social History    Tobacco Use      Smoking status: Never      Smokeless tobacco: Not on file    Review of Systems  Constitutional POS: nothing reported    NEG: anorexia or night sweats   Genitourinary POS: nothing reported    NEG: dysuria or hematuria      Gastointestinal POS: nothing reported    NEG: bloating, change in bowel habits, melena or reflux symptoms   Integument POS: nothing reported    NEG: moles that are changing in size, shape, color or rashes   Breast POS: nothing reported    NEG: persistent breast lump, skin dimpling or nipple discharge        Objective   /80   Resp 14   Wt 95.7 kg (211 lb)   LMP 02/23/2023   BMI 32.08 kg/m²     General:  well developed; well nourished  no acute distress   Skin:  No suspicious lesions seen   Thyroid: normal to inspection and palpation   Breasts:  Examined in supine position  Symmetric without masses or skin dimpling  Nipples normal without inversion, lesions or discharge  There are no palpable axillary nodes   Abdomen: soft, non-tender; no masses  no umbilical or inguinal hernias are present  no hepato-splenomegaly   Pelvis: Clinical staff was present for exam  External genitalia:  normal appearance of the external genitalia including Bartholin's and [place]'s glands.  :  urethral meatus normal;  Vaginal:  normal pink mucosa without prolapse or lesions.  Cervix:  normal appearance.  Uterus:  normal size, shape and consistency.  Adnexa:  non palpable bilaterally.  Rectal:  digital rectal exam not performed; anus visually normal appearing.        Assessment   1. Normal GYN exam  2. Anxiety with depression with luteal phase exacerbation suboptimally controlled on current medication.  Etiology unclear.  Could be menopause.  Could be suboptimal control of medication due to sporadic dosing of medication.  Could be  sleep deprivation related to insufficient dosing of clonazepam  3. She is up to date on all relevant gynecologic and colorectal screenings except colon cancer screening     Plan   1. Pap was not done today.  I explained to Francia that the recommendations for Pap smear interval in a low risk patient has lengthened to 3 years time.  I told Francia she still needs to be seen in our office yearly for a full physical including breast and pelvic exam.  2. She was encouraged to get yearly mammograms.  She should report any palpable breast lump(s) or skin changes regardless of mammographic findings.  I explained to Francia that notification regarding her mammogram results will come from the center performing the study.  Our office will not be routinely calling with mammogram results.  It is her responsibility to make sure that the results from the mammogram are communicated to her by the breast center.  If she has any questions about the results, she is welcome to call our office anytime.  3. Colonoscopy was recommended for screening for colon cancer.  The procedure was briefly discussed and its benefits for early detection of colon cancer were emphasized.  I explained to Francia that we could help her to schedule it if she wishes.  Additionally, she could also contact her primary care physician to help make this arrangement.  Alternatively, she could consider Cologuard (which would need to be done every 3 years).  If Cologuard was abnormal, colonoscopy will be required in follow-up.  In addition to being diagnostic, colonoscopy has the potential to be pre-emptive.  If a precancerous polyp was seen and removed, the chance of that polyp becoming cancerous is essentially eliminated.  Cologuard will not find the precancerous polyps as well as it does colon cancer.  Finally, Cologuard does not provide the opportunity to find and remove the precancerous polyps as readily as does colonoscopy.  After considering these options she  will contact her PCP to arrange colonoscopy.  4. Her vaccine record was reviewed and updated.  5. Have encouraged her to stop the placebo pill and go directly to active medication and also to take a full milligram of Lexapro daily and not try to alternate between half and 1 mg  6. The importance of keeping all planned follow-up and taking all medications as prescribed was emphasized.  7. Follow up in 3 months to recheck symptoms     New Medications Ordered This Visit   Medications   • Pirmella 1/35 1-35 MG-MCG per tablet     Sig: Take 1 tablet by mouth Daily.     Dispense:  84 tablet     Refill:  4   • escitalopram (LEXAPRO) 10 MG tablet     Sig: Take 1 tablet by mouth Every Morning.     Dispense:  90 tablet     Refill:  4   • clonazePAM (KlonoPIN) 1 MG tablet     Sig: Take 1 tablet by mouth At Night As Needed for Anxiety.     Dispense:  30 tablet     Refill:  1          This note was electronically signed.    Jarrod Bass M.D.  March 20, 2023    Part of this note may be an electronic transcription/translation of spoken language to printed text using the Dragon Dictation System.

## 2023-03-20 ENCOUNTER — OFFICE VISIT (OUTPATIENT)
Dept: OBSTETRICS AND GYNECOLOGY | Facility: CLINIC | Age: 48
End: 2023-03-20
Payer: COMMERCIAL

## 2023-03-20 VITALS
DIASTOLIC BLOOD PRESSURE: 80 MMHG | RESPIRATION RATE: 14 BRPM | BODY MASS INDEX: 32.08 KG/M2 | WEIGHT: 211 LBS | SYSTOLIC BLOOD PRESSURE: 124 MMHG

## 2023-03-20 DIAGNOSIS — F41.8 DEPRESSION WITH ANXIETY: ICD-10-CM

## 2023-03-20 DIAGNOSIS — Z71.85 VACCINE COUNSELING: ICD-10-CM

## 2023-03-20 DIAGNOSIS — Z01.419 WELL WOMAN EXAM WITH ROUTINE GYNECOLOGICAL EXAM: Primary | ICD-10-CM

## 2023-03-20 PROCEDURE — 99396 PREV VISIT EST AGE 40-64: CPT | Performed by: OBSTETRICS & GYNECOLOGY

## 2023-03-20 RX ORDER — NORETHINDRONE AND ETHINYL ESTRADIOL 1 MG-35MCG
1 KIT ORAL DAILY
Qty: 84 TABLET | Refills: 4 | Status: SHIPPED | OUTPATIENT
Start: 2023-03-20

## 2023-03-20 RX ORDER — CLONAZEPAM 1 MG/1
1 TABLET ORAL NIGHTLY PRN
Qty: 30 TABLET | Refills: 1 | Status: SHIPPED | OUTPATIENT
Start: 2023-03-20

## 2023-03-20 RX ORDER — ESCITALOPRAM OXALATE 10 MG/1
10 TABLET ORAL EVERY MORNING
Qty: 90 TABLET | Refills: 4 | Status: SHIPPED | OUTPATIENT
Start: 2023-03-20

## 2023-05-04 ENCOUNTER — TRANSCRIBE ORDERS (OUTPATIENT)
Dept: ADMINISTRATIVE | Facility: HOSPITAL | Age: 48
End: 2023-05-04
Payer: COMMERCIAL

## 2023-05-04 DIAGNOSIS — Z12.31 VISIT FOR SCREENING MAMMOGRAM: Primary | ICD-10-CM

## 2023-10-31 ENCOUNTER — TELEPHONE (OUTPATIENT)
Dept: OBSTETRICS AND GYNECOLOGY | Facility: CLINIC | Age: 48
End: 2023-10-31
Payer: COMMERCIAL

## 2023-10-31 RX ORDER — NORETHINDRONE AND ETHINYL ESTRADIOL 1 MG-35MCG
1 KIT ORAL DAILY
Qty: 84 TABLET | Refills: 4 | Status: CANCELLED | OUTPATIENT
Start: 2023-10-31

## 2023-10-31 NOTE — TELEPHONE ENCOUNTER
Pt sees dr greer. He told her to skip placebo week with Dasetta prescription.     Each month insurance won't fill it soon enough because she isn't due to be out yet.    Please resend prescription with instructions to skip placebo week so pt can stop paying out of pocket for this. thanks      [facility] in [place]  Phone: [phone] Fax: [phone]       Call pt   [phone]

## 2023-11-01 RX ORDER — NORETHINDRONE AND ETHINYL ESTRADIOL 1 MG-35MCG
KIT ORAL
Qty: 84 TABLET | Refills: 2 | Status: SHIPPED | OUTPATIENT
Start: 2023-11-01

## 2023-11-01 NOTE — TELEPHONE ENCOUNTER
Done    New Medications Ordered This Visit   Medications    Pirmella  1-35 MG-MCG per tablet     Si tablet by mouth daily, omitting the placebo pills each month and starting directly on a new pack of pills     Dispense:  84 tablet     Refill:  2

## 2024-05-07 RX ORDER — NORETHINDRONE AND ETHINYL ESTRADIOL 1 MG-35MCG
KIT ORAL
Qty: 84 TABLET | Refills: 0 | OUTPATIENT
Start: 2024-05-07

## 2024-05-16 NOTE — TELEPHONE ENCOUNTER
Called attempting to reach patient; no answer, calls not seeming to go through at this time.    Patient needs to reschedule annual appt which is now overdue.

## 2024-05-17 ENCOUNTER — TELEPHONE (OUTPATIENT)
Dept: OBSTETRICS AND GYNECOLOGY | Facility: CLINIC | Age: 49
End: 2024-05-17
Payer: COMMERCIAL

## 2024-05-17 NOTE — TELEPHONE ENCOUNTER
Kali     Pt called and stated that she is completely out of her birth control and is needing it refilled to last until her annual appointment. Please advise    [facility] in [place] was confirmed

## 2024-05-20 ENCOUNTER — TELEPHONE (OUTPATIENT)
Dept: OBSTETRICS AND GYNECOLOGY | Facility: CLINIC | Age: 49
End: 2024-05-20
Payer: COMMERCIAL

## 2024-05-20 RX ORDER — NORETHINDRONE AND ETHINYL ESTRADIOL 1 MG-35MCG
KIT ORAL
Qty: 84 TABLET | Refills: 1 | Status: SHIPPED | OUTPATIENT
Start: 2024-05-20 | End: 2024-05-20

## 2024-05-20 RX ORDER — NORETHINDRONE AND ETHINYL ESTRADIOL 1 MG-35MCG
KIT ORAL
Qty: 84 TABLET | Refills: 0 | OUTPATIENT
Start: 2024-05-20

## 2024-05-20 RX ORDER — NORETHINDRONE AND ETHINYL ESTRADIOL 1 MG-35MCG
1 KIT ORAL DAILY
Qty: 84 TABLET | Refills: 1 | Status: SHIPPED | OUTPATIENT
Start: 2024-05-20

## 2024-05-20 NOTE — TELEPHONE ENCOUNTER
[facility]    Pharmacy called asked if Pirmella could be replaced with Dasetta.  The pharmacy doesn't have pirmella in stock and the patient is ok with the substitute.  She is at the pharmacy now. Please advise.

## 2024-05-20 NOTE — TELEPHONE ENCOUNTER
That is fine with me.    New Medications Ordered This Visit   Medications    norethindrone-ethinyl estradiol (Dasetta 1/35) 1-35 MG-MCG per tablet     Sig: Take 1 tablet by mouth Daily.     Dispense:  84 tablet     Refill:  1

## 2024-05-20 NOTE — TELEPHONE ENCOUNTER
Patient is now scheduled for annual appt on 7/30/2024 with JUSTIN Garcia.    Routing to provider for review of refill request.

## 2024-05-20 NOTE — TELEPHONE ENCOUNTER
Refills sent to pharmacy.    New Medications Ordered This Visit   Medications    Pirmella  1-35 MG-MCG per tablet     Si tablet by mouth daily, omitting the placebo pills each month and starting directly on a new pack of pills     Dispense:  84 tablet     Refill:  1

## 2024-05-20 NOTE — TELEPHONE ENCOUNTER
Called attempting to reach patient; no answer, unable to LVM requesting call back due to no connection on mobile number and no VM box offered on work number.    /HUB TO SHARE:  If/When Patient returns call you may inform her that there OCP refill has been sent in for her to hold her over to upcoming appt.  Once informed, please document accordingly here on this encounter and route to pool to inform us. Thank you!

## 2024-06-24 ENCOUNTER — TELEPHONE (OUTPATIENT)
Dept: OBSTETRICS AND GYNECOLOGY | Facility: CLINIC | Age: 49
End: 2024-06-24
Payer: COMMERCIAL

## 2024-06-24 RX ORDER — ESCITALOPRAM OXALATE 10 MG/1
TABLET ORAL
Qty: 30 TABLET | Refills: 0 | OUTPATIENT
Start: 2024-06-24

## 2024-06-24 RX ORDER — ESCITALOPRAM OXALATE 10 MG/1
10 TABLET ORAL EVERY MORNING
Qty: 90 TABLET | Refills: 0 | Status: SHIPPED | OUTPATIENT
Start: 2024-06-24

## 2024-06-24 NOTE — TELEPHONE ENCOUNTER
Called attempting to reach patient; no answer, LVM requesting call back and provided office call back number.    /HUB OKAY TO SHARE:  Patient's requested Rx for Lexapro has been sent in.  If/When patient calls back you may relay this information to them, please document in this encounter accordingly if patient has been informed.

## 2024-06-24 NOTE — TELEPHONE ENCOUNTER
Patient called stating that she is out of refills of Lexapro 10 mg.  Patient says she has five days left.  Patient has an appointment with  Alee on July 30 for her annual.  Please Advise.    Nika:  [facility]  [phone]  Phone  [phone]  Fax

## 2024-06-24 NOTE — TELEPHONE ENCOUNTER
Done    New Medications Ordered This Visit   Medications    escitalopram (LEXAPRO) 10 MG tablet     Sig: Take 1 tablet by mouth Every Morning.     Dispense:  90 tablet     Refill:  0

## 2024-06-25 NOTE — TELEPHONE ENCOUNTER
Called and spoke with patient, informed her of Lexapro being called in.  She verified understanding.

## 2024-07-03 ENCOUNTER — TRANSCRIBE ORDERS (OUTPATIENT)
Dept: ADMINISTRATIVE | Facility: HOSPITAL | Age: 49
End: 2024-07-03
Payer: COMMERCIAL

## 2024-07-03 DIAGNOSIS — Z12.31 SCREENING MAMMOGRAM FOR BREAST CANCER: Primary | ICD-10-CM

## 2024-07-29 NOTE — PROGRESS NOTES
Subjective   Chief Complaint   Patient presents with    Annual Exam     Well woman exam and some vaginal discomfort and needs refills     Francia Gonzalez is a 48 y.o. year old  presenting to be seen for her annual exam.   At the time of her annual last year, she reported increased insomnia and depression symptoms the week of her placebo pills. Her last pap was 2021 with normal cytology.   She has not yet had a screening colonoscopy, She is planning to call and get this scheduled.  She is concerned about weight gain. 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 She is up about 10 pounds since her last visit in 2024. She is on lexapro for mood changes. She takes her ocps continuously as she was having worsening mood changes with cycle.   She reports that she ran out of her OCPs a few days ago and is having vaginal spotting today.  She also reports vaginal itching and irritation.  This is similar to the symptoms she had with her cycle when she was not taking her OCPs continuously.  She had a birads 1 mammogram 2023 with repeat testing today.   SEXUAL Hx:  She is currently sexually active.  In the past year there there has been NO new sexual partners.    Condoms are never used.  She would not like to be screened for STD's at today's exam.  Current birth control method: OCP (estrogen/progesterone).  She is happy with her current method of contraception and does not want to discuss alternative methods of contraception.  MENSTRUAL Hx:  No LMP recorded (lmp unknown). (Menstrual status: Other).  In the past 6 months her cycles have been absent.  Her menstrual flow has been absent.   Each month on average there are roughly 0 day(s) of very heavy flow.  Using continuous ocps.   Intermenstrual bleeding is absent.    Post-coital bleeding is absent.  Dysmenorrhea: is not affecting her activities of daily living  PMS: is not affecting her activities of daily  "living  Her cycles are not a source of concern for her that she wishes to discuss today. When not taking ocps she was having irregular periods.   HEALTH Hx:  She exercises regularly: yes. Usually, less active in the summer when dance classes are out of session   She wears her seat belt: yes.  She has concerns about domestic violence: no.  OTHER THINGS SHE WANTS TO DISCUSS TODAY:  Nothing else    The following portions of the patient's history were reviewed and updated as appropriate:problem list, current medications, allergies, past family history, past medical history, past social history, and past surgical history.    Social History    Tobacco Use      Smoking status: Never      Smokeless tobacco: Not on file    Review of Systems  Constitutional POS: night sweats    NEG: anorexia   Genitourinary POS: urgency    NEG: dysuria or hematuria      Gastointestinal POS: nothing reported    NEG: bloating, change in bowel habits, melena, or reflux symptoms   Integument POS: nothing reported    NEG: moles that are changing in size, shape, color or rashes   Breast POS: nothing reported    NEG: persistent breast lump, skin dimpling, or nipple discharge        Objective   /82 (BP Location: Left arm, Patient Position: Sitting, Cuff Size: Adult)   Ht 172.7 cm (68\")   Wt 100 kg (221 lb 3.2 oz)   LMP  (LMP Unknown)   BMI 33.63 kg/m²     General:  well developed; well nourished  no acute distress  obese - Body mass index is 33.63 kg/m².   Skin:  No suspicious lesions seen   Thyroid: normal to inspection and palpation   Breasts:  Examined in supine position  Symmetric without masses or skin dimpling  Nipples normal without inversion, lesions or discharge  There are no palpable axillary nodes  Dense breast tissue noted bilaterally   Abdomen: soft, non-tender; no masses  no umbilical or inguinal hernias are present  no hepato-splenomegaly   Pelvis: Clinical staff was present for exam  :  urethral meatus normal;  Vaginal:  " normal pink mucosa without prolapse or lesions.  Cervix:  normal appearance.  Dark menstrual bleeding noted  Uterus:  normal size, shape and consistency.  Adnexa:  normal bimanual exam of the adnexa.  Rectal:  digital rectal exam not performed; anus visually normal appearing.        Assessment   Well woman with routine gynecological exam  Perimenopausal symptoms including hot flashes, night sweats, vaginal irritation, weight gain.  Currently on OCPs taking continuously.  Concern for weight gain  Had screening mammogram today  Due for colonoscopy screening     Plan     Pap was done today.  If she does not receive the results of the Pap within 2 weeks  time, she was instructed to call to find out the results.  I explained to Francia that the recommendations for Pap smear interval in a low risk patient's has lengthened to 3 years time.  I encouraged her to be seen yearly for a full physical exam including breast and pelvic exam even during the off years when PAP's will not be performed.  Discussed with Francia as she was having menstrual bleeding today may need to repeat Pap collection if an adequate sample obtained today.  She was encouraged to get yearly mammograms.  She should report any palpable breast lump(s) or skin changes regardless of mammographic findings.  I explained to Francia that notification regarding her mammogram results will come from the center performing the study.  Our office will not be routinely calling with mammogram results.  It is her responsibility to make sure that the results from the mammogram are communicated to her by the breast center.  If she has any questions about the results, she is welcome to call our office anytime.  Colonoscopy was recommended for screening for colon cancer.  The procedure was briefly discussed and its benefits for early detection of colon cancer were emphasized.  I explained to Francia that we could help her to schedule it if she wishes.  Additionally, she  could also contact her primary care physician to help make this arrangement.  After considering these options she  states she will call to get screening colonoscopy scheduled closer to home. .  Discussed weight gain concerns with patient.  Discussed option of transitioning from Lexapro to Wellbutrin.  She will discuss this with her PCP and notify provider if she would like to change medication.  Prescription(s) for OCP's and anti-depressants were refilled today   1 swab collected for vaginitis panel.  UA sent for dysuria.  If testing is negative for infection consider addition of Estrace cream  The importance of keeping all planned follow-up and taking all medications as prescribed was emphasized.  Today I discussed with Francia the total recommended calcium intake for a premenopausal female is 1000 mg.  Ideally this should be from dietary sources.  I reviewed calcium content in various foods including milk, fortified orange juice and yogurt.  If she cannot get sufficient calcium through dietary means, it is recommended to supplement with either a multivitamin or calcium to reach her daily goal.  I also reviewed the difference in the bioavailability of calcium carbonate and calcium citrate containing supplements and the importance of taking calcium carbonate containing products with food.  Finally, vitamin D's role in calcium absorption was reviewed and a total daily vitamin D intake of 800 units was recommended.  Follow up for annual exam 1 year or as needed    New Medications Ordered This Visit   Medications    escitalopram (LEXAPRO) 10 MG tablet     Sig: Take 1 tablet by mouth Every Morning.     Dispense:  90 tablet     Refill:  3    norethindrone-ethinyl estradiol (Dasetta 1/35) 1-35 MG-MCG per tablet     Sig: Take 1 tablet by mouth Daily. Take 1 tablet by mouth daily     Dispense:  84 tablet     Refill:  4          This note was electronically signed.    Alee Newman, JUSTIN  July 30, 2024

## 2024-07-30 ENCOUNTER — HOSPITAL ENCOUNTER (OUTPATIENT)
Facility: HOSPITAL | Age: 49
Discharge: HOME | End: 2024-07-30
Admitting: OBSTETRICS & GYNECOLOGY
Payer: COMMERCIAL

## 2024-07-30 ENCOUNTER — OFFICE VISIT (OUTPATIENT)
Dept: OBSTETRICS AND GYNECOLOGY | Facility: CLINIC | Age: 49
End: 2024-07-30
Payer: COMMERCIAL

## 2024-07-30 VITALS
WEIGHT: 221.2 LBS | HEIGHT: 68 IN | BODY MASS INDEX: 33.52 KG/M2 | DIASTOLIC BLOOD PRESSURE: 82 MMHG | SYSTOLIC BLOOD PRESSURE: 120 MMHG

## 2024-07-30 DIAGNOSIS — Z12.31 SCREENING MAMMOGRAM FOR BREAST CANCER: ICD-10-CM

## 2024-07-30 DIAGNOSIS — R30.0 DYSURIA: ICD-10-CM

## 2024-07-30 DIAGNOSIS — N89.8 VAGINAL IRRITATION: ICD-10-CM

## 2024-07-30 DIAGNOSIS — Z01.419 WELL WOMAN EXAM WITH ROUTINE GYNECOLOGICAL EXAM: Primary | ICD-10-CM

## 2024-07-30 LAB
HOLD SPECIMEN: NORMAL
NCCN CRITERIA FLAG: NORMAL
TYRER CUZICK SCORE: 11

## 2024-07-30 PROCEDURE — 99396 PREV VISIT EST AGE 40-64: CPT | Performed by: NURSE PRACTITIONER

## 2024-07-30 PROCEDURE — 77063 BREAST TOMOSYNTHESIS BI: CPT

## 2024-07-30 PROCEDURE — 81001 URINALYSIS AUTO W/SCOPE: CPT | Performed by: NURSE PRACTITIONER

## 2024-07-30 PROCEDURE — 77067 SCR MAMMO BI INCL CAD: CPT

## 2024-07-30 RX ORDER — ESCITALOPRAM OXALATE 10 MG/1
10 TABLET ORAL EVERY MORNING
Qty: 90 TABLET | Refills: 3 | Status: SHIPPED | OUTPATIENT
Start: 2024-07-30

## 2024-07-30 RX ORDER — NORETHINDRONE AND ETHINYL ESTRADIOL 1 MG-35MCG
1 KIT ORAL DAILY
Qty: 84 TABLET | Refills: 4 | Status: SHIPPED | OUTPATIENT
Start: 2024-07-30

## 2024-07-30 RX ORDER — NORETHINDRONE AND ETHINYL ESTRADIOL 1 MG-35MCG
1 KIT ORAL DAILY
Qty: 84 TABLET | Refills: 4 | Status: SHIPPED | OUTPATIENT
Start: 2024-07-30 | End: 2024-07-30

## 2024-07-31 LAB
BACTERIA UR QL AUTO: ABNORMAL /HPF
BILIRUB UR QL STRIP: NEGATIVE
CLARITY UR: ABNORMAL
COLOR UR: YELLOW
GLUCOSE UR STRIP-MCNC: NEGATIVE MG/DL
HGB UR QL STRIP.AUTO: ABNORMAL
HYALINE CASTS UR QL AUTO: ABNORMAL /LPF
KETONES UR QL STRIP: ABNORMAL
LEUKOCYTE ESTERASE UR QL STRIP.AUTO: NEGATIVE
NITRITE UR QL STRIP: NEGATIVE
PH UR STRIP.AUTO: 5.5 [PH] (ref 5–8)
PROT UR QL STRIP: ABNORMAL
RBC # UR STRIP: ABNORMAL /HPF
REF LAB TEST METHOD: ABNORMAL
SP GR UR STRIP: 1.03 (ref 1–1.03)
SQUAMOUS #/AREA URNS HPF: ABNORMAL /HPF
UROBILINOGEN UR QL STRIP: ABNORMAL
WBC # UR STRIP: ABNORMAL /HPF

## 2024-08-06 LAB — REF LAB TEST METHOD: NORMAL

## 2024-08-29 RX ORDER — METRONIDAZOLE 500 MG/1
500 TABLET ORAL 2 TIMES DAILY
Qty: 14 TABLET | Refills: 0 | Status: SHIPPED | OUTPATIENT
Start: 2024-08-29 | End: 2024-09-05

## 2025-05-30 RX ORDER — NORETHINDRONE AND ETHINYL ESTRADIOL 1 MG-35MCG
KIT ORAL
Qty: 84 TABLET | Refills: 0 | Status: SHIPPED | OUTPATIENT
Start: 2025-05-30

## 2025-07-29 RX ORDER — NORETHINDRONE AND ETHINYL ESTRADIOL 1 MG-35MCG
KIT ORAL
Qty: 84 TABLET | Refills: 0 | OUTPATIENT
Start: 2025-07-29

## 2025-08-04 ENCOUNTER — OFFICE VISIT (OUTPATIENT)
Dept: OBSTETRICS AND GYNECOLOGY | Facility: CLINIC | Age: 50
End: 2025-08-04
Payer: COMMERCIAL

## 2025-08-04 VITALS
BODY MASS INDEX: 32.23 KG/M2 | DIASTOLIC BLOOD PRESSURE: 72 MMHG | RESPIRATION RATE: 14 BRPM | WEIGHT: 212 LBS | SYSTOLIC BLOOD PRESSURE: 128 MMHG

## 2025-08-04 DIAGNOSIS — Z01.419 WELL WOMAN EXAM WITH ROUTINE GYNECOLOGICAL EXAM: Primary | ICD-10-CM

## 2025-08-04 DIAGNOSIS — F41.8 DEPRESSION WITH ANXIETY: ICD-10-CM

## 2025-08-04 DIAGNOSIS — Z71.85 VACCINE COUNSELING: ICD-10-CM

## 2025-08-04 DIAGNOSIS — N95.1 MENOPAUSAL SYMPTOM: ICD-10-CM

## 2025-08-04 PROCEDURE — 99396 PREV VISIT EST AGE 40-64: CPT | Performed by: OBSTETRICS & GYNECOLOGY

## 2025-08-04 RX ORDER — ERGOCALCIFEROL 1.25 MG/1
1 CAPSULE, LIQUID FILLED ORAL WEEKLY
COMMUNITY
Start: 2025-06-17

## 2025-08-04 RX ORDER — NORETHINDRONE AND ETHINYL ESTRADIOL 1 MG-35MCG
KIT ORAL
Qty: 84 TABLET | Refills: 4 | Status: SHIPPED | OUTPATIENT
Start: 2025-08-04

## 2025-08-04 RX ORDER — ONDANSETRON 4 MG/1
TABLET, ORALLY DISINTEGRATING ORAL
COMMUNITY
Start: 2025-06-10

## 2025-08-04 RX ORDER — SEMAGLUTIDE 0.25 MG/.5ML
INJECTION, SOLUTION SUBCUTANEOUS
COMMUNITY
Start: 2025-06-26

## 2025-08-04 RX ORDER — ESCITALOPRAM OXALATE 10 MG/1
10 TABLET ORAL EVERY MORNING
Qty: 90 TABLET | Refills: 4 | Status: SHIPPED | OUTPATIENT
Start: 2025-08-04

## 2025-08-04 RX ORDER — MELOXICAM 15 MG/1
1 TABLET ORAL DAILY
COMMUNITY
Start: 2025-06-17